# Patient Record
Sex: MALE | Race: OTHER
[De-identification: names, ages, dates, MRNs, and addresses within clinical notes are randomized per-mention and may not be internally consistent; named-entity substitution may affect disease eponyms.]

---

## 2020-02-06 ENCOUNTER — HOSPITAL ENCOUNTER (EMERGENCY)
Dept: HOSPITAL 54 - ER | Age: 40
LOS: 1 days | Discharge: HOME | End: 2020-02-07
Payer: SELF-PAY

## 2020-02-06 VITALS — BODY MASS INDEX: 22.9 KG/M2 | HEIGHT: 70 IN | WEIGHT: 160 LBS
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
## 2020-02-06 DIAGNOSIS — S33.5XXA: ICD-10-CM

## 2020-02-06 DIAGNOSIS — S63.592A: Primary | ICD-10-CM
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
## 2020-02-06 DIAGNOSIS — S09.8XXA: ICD-10-CM

## 2020-02-06 DIAGNOSIS — R51: ICD-10-CM
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
## 2020-02-07 VITALS — DIASTOLIC BLOOD PRESSURE: 68 MMHG | SYSTOLIC BLOOD PRESSURE: 118 MMHG
